# Patient Record
Sex: MALE | Race: WHITE | NOT HISPANIC OR LATINO | Employment: UNEMPLOYED | URBAN - NONMETROPOLITAN AREA
[De-identification: names, ages, dates, MRNs, and addresses within clinical notes are randomized per-mention and may not be internally consistent; named-entity substitution may affect disease eponyms.]

---

## 2024-08-13 ENCOUNTER — HOSPITAL ENCOUNTER (EMERGENCY)
Facility: HOSPITAL | Age: 54
Discharge: STILL ADMITTED OR ADMINISTRATIVE | End: 2024-08-13
Attending: STUDENT IN AN ORGANIZED HEALTH CARE EDUCATION/TRAINING PROGRAM
Payer: COMMERCIAL

## 2024-08-13 ENCOUNTER — HOSPITAL ENCOUNTER (OUTPATIENT)
Facility: HOSPITAL | Age: 54
Setting detail: OBSERVATION
Discharge: NURSING, REHAB OR LONG-TERM CARE | End: 2024-08-15
Attending: PSYCHIATRY & NEUROLOGY | Admitting: PSYCHIATRY & NEUROLOGY
Payer: COMMERCIAL

## 2024-08-13 VITALS
SYSTOLIC BLOOD PRESSURE: 155 MMHG | RESPIRATION RATE: 18 BRPM | OXYGEN SATURATION: 99 % | TEMPERATURE: 98.9 F | HEIGHT: 70 IN | WEIGHT: 180 LBS | DIASTOLIC BLOOD PRESSURE: 104 MMHG | HEART RATE: 104 BPM | BODY MASS INDEX: 25.77 KG/M2

## 2024-08-13 DIAGNOSIS — F10.10 ALCOHOL ABUSE: Primary | ICD-10-CM

## 2024-08-13 PROBLEM — F10.20 ETOH DEPENDENCE: Status: ACTIVE | Noted: 2024-08-13

## 2024-08-13 LAB
ALBUMIN SERPL-MCNC: 4.4 G/DL (ref 3.5–5.2)
ALBUMIN/GLOB SERPL: 1.6 G/DL
ALP SERPL-CCNC: 79 U/L (ref 39–117)
ALT SERPL W P-5'-P-CCNC: 25 U/L (ref 1–41)
AMPHET+METHAMPHET UR QL: NEGATIVE
AMPHETAMINES UR QL: NEGATIVE
AMYLASE SERPL-CCNC: 55 U/L (ref 28–100)
ANION GAP SERPL CALCULATED.3IONS-SCNC: 13.1 MMOL/L (ref 5–15)
AST SERPL-CCNC: 25 U/L (ref 1–40)
BARBITURATES UR QL SCN: NEGATIVE
BASOPHILS # BLD AUTO: 0.04 10*3/MM3 (ref 0–0.2)
BASOPHILS NFR BLD AUTO: 0.4 % (ref 0–1.5)
BENZODIAZ UR QL SCN: NEGATIVE
BILIRUB SERPL-MCNC: 0.5 MG/DL (ref 0–1.2)
BILIRUB UR QL STRIP: NEGATIVE
BUN SERPL-MCNC: 11 MG/DL (ref 6–20)
BUN/CREAT SERPL: 13.9 (ref 7–25)
BUPRENORPHINE SERPL-MCNC: NEGATIVE NG/ML
CALCIUM SPEC-SCNC: 9.1 MG/DL (ref 8.6–10.5)
CANNABINOIDS SERPL QL: NEGATIVE
CHLORIDE SERPL-SCNC: 99 MMOL/L (ref 98–107)
CLARITY UR: CLEAR
CO2 SERPL-SCNC: 24.9 MMOL/L (ref 22–29)
COCAINE UR QL: NEGATIVE
COLOR UR: YELLOW
CREAT SERPL-MCNC: 0.79 MG/DL (ref 0.76–1.27)
DEPRECATED RDW RBC AUTO: 43.5 FL (ref 37–54)
EGFRCR SERPLBLD CKD-EPI 2021: 104.9 ML/MIN/1.73
EOSINOPHIL # BLD AUTO: 0.11 10*3/MM3 (ref 0–0.4)
EOSINOPHIL NFR BLD AUTO: 1.1 % (ref 0.3–6.2)
ERYTHROCYTE [DISTWIDTH] IN BLOOD BY AUTOMATED COUNT: 13 % (ref 12.3–15.4)
ETHANOL BLD-MCNC: <10 MG/DL (ref 0–10)
ETHANOL UR QL: <0.01 %
FENTANYL UR-MCNC: NEGATIVE NG/ML
GLOBULIN UR ELPH-MCNC: 2.7 GM/DL
GLUCOSE SERPL-MCNC: 106 MG/DL (ref 65–99)
GLUCOSE UR STRIP-MCNC: NEGATIVE MG/DL
HCT VFR BLD AUTO: 44.2 % (ref 37.5–51)
HGB BLD-MCNC: 15 G/DL (ref 13–17.7)
HGB UR QL STRIP.AUTO: NEGATIVE
HOLD SPECIMEN: NORMAL
HOLD SPECIMEN: NORMAL
IMM GRANULOCYTES # BLD AUTO: 0.03 10*3/MM3 (ref 0–0.05)
IMM GRANULOCYTES NFR BLD AUTO: 0.3 % (ref 0–0.5)
KETONES UR QL STRIP: NEGATIVE
LEUKOCYTE ESTERASE UR QL STRIP.AUTO: NEGATIVE
LIPASE SERPL-CCNC: 13 U/L (ref 13–60)
LYMPHOCYTES # BLD AUTO: 1.49 10*3/MM3 (ref 0.7–3.1)
LYMPHOCYTES NFR BLD AUTO: 15.5 % (ref 19.6–45.3)
MAGNESIUM SERPL-MCNC: 2.1 MG/DL (ref 1.6–2.6)
MCH RBC QN AUTO: 30.7 PG (ref 26.6–33)
MCHC RBC AUTO-ENTMCNC: 33.9 G/DL (ref 31.5–35.7)
MCV RBC AUTO: 90.6 FL (ref 79–97)
METHADONE UR QL SCN: NEGATIVE
MONOCYTES # BLD AUTO: 0.43 10*3/MM3 (ref 0.1–0.9)
MONOCYTES NFR BLD AUTO: 4.5 % (ref 5–12)
NEUTROPHILS NFR BLD AUTO: 7.51 10*3/MM3 (ref 1.7–7)
NEUTROPHILS NFR BLD AUTO: 78.2 % (ref 42.7–76)
NITRITE UR QL STRIP: NEGATIVE
NRBC BLD AUTO-RTO: 0 /100 WBC (ref 0–0.2)
OPIATES UR QL: NEGATIVE
OXYCODONE UR QL SCN: NEGATIVE
PCP UR QL SCN: NEGATIVE
PH UR STRIP.AUTO: 7.5 [PH] (ref 5–8)
PLATELET # BLD AUTO: 225 10*3/MM3 (ref 140–450)
PMV BLD AUTO: 9.7 FL (ref 6–12)
POTASSIUM SERPL-SCNC: 4.2 MMOL/L (ref 3.5–5.2)
PROT SERPL-MCNC: 7.1 G/DL (ref 6–8.5)
PROT UR QL STRIP: NEGATIVE
QT INTERVAL: 358 MS
QTC INTERVAL: 454 MS
RBC # BLD AUTO: 4.88 10*6/MM3 (ref 4.14–5.8)
SODIUM SERPL-SCNC: 137 MMOL/L (ref 136–145)
SP GR UR STRIP: 1.01 (ref 1–1.03)
TRICYCLICS UR QL SCN: NEGATIVE
TROPONIN T SERPL HS-MCNC: 8 NG/L
UROBILINOGEN UR QL STRIP: NORMAL
WBC NRBC COR # BLD AUTO: 9.61 10*3/MM3 (ref 3.4–10.8)
WHOLE BLOOD HOLD COAG: NORMAL
WHOLE BLOOD HOLD SPECIMEN: NORMAL

## 2024-08-13 PROCEDURE — 83735 ASSAY OF MAGNESIUM: CPT | Performed by: PHYSICIAN ASSISTANT

## 2024-08-13 PROCEDURE — G0378 HOSPITAL OBSERVATION PER HR: HCPCS

## 2024-08-13 PROCEDURE — 63710000001 ONDANSETRON ODT 4 MG TABLET DISPERSIBLE: Performed by: PHYSICIAN ASSISTANT

## 2024-08-13 PROCEDURE — 82077 ASSAY SPEC XCP UR&BREATH IA: CPT | Performed by: PHYSICIAN ASSISTANT

## 2024-08-13 PROCEDURE — 99285 EMERGENCY DEPT VISIT HI MDM: CPT

## 2024-08-13 PROCEDURE — 63710000001 ONDANSETRON ODT 4 MG TABLET DISPERSIBLE: Performed by: PSYCHIATRY & NEUROLOGY

## 2024-08-13 PROCEDURE — 93005 ELECTROCARDIOGRAM TRACING: CPT | Performed by: PHYSICIAN ASSISTANT

## 2024-08-13 PROCEDURE — 84484 ASSAY OF TROPONIN QUANT: CPT | Performed by: PHYSICIAN ASSISTANT

## 2024-08-13 PROCEDURE — 82150 ASSAY OF AMYLASE: CPT | Performed by: PHYSICIAN ASSISTANT

## 2024-08-13 PROCEDURE — 80307 DRUG TEST PRSMV CHEM ANLYZR: CPT | Performed by: PHYSICIAN ASSISTANT

## 2024-08-13 PROCEDURE — 81003 URINALYSIS AUTO W/O SCOPE: CPT | Performed by: PHYSICIAN ASSISTANT

## 2024-08-13 PROCEDURE — 85025 COMPLETE CBC W/AUTO DIFF WBC: CPT | Performed by: PHYSICIAN ASSISTANT

## 2024-08-13 PROCEDURE — 93010 ELECTROCARDIOGRAM REPORT: CPT | Performed by: INTERNAL MEDICINE

## 2024-08-13 PROCEDURE — 83690 ASSAY OF LIPASE: CPT | Performed by: PHYSICIAN ASSISTANT

## 2024-08-13 PROCEDURE — 80053 COMPREHEN METABOLIC PANEL: CPT | Performed by: PHYSICIAN ASSISTANT

## 2024-08-13 PROCEDURE — 36415 COLL VENOUS BLD VENIPUNCTURE: CPT

## 2024-08-13 RX ORDER — TRAZODONE HYDROCHLORIDE 50 MG/1
50 TABLET ORAL NIGHTLY PRN
Status: DISCONTINUED | OUTPATIENT
Start: 2024-08-13 | End: 2024-08-15 | Stop reason: HOSPADM

## 2024-08-13 RX ORDER — LOPERAMIDE HYDROCHLORIDE 2 MG/1
2 CAPSULE ORAL
Status: DISCONTINUED | OUTPATIENT
Start: 2024-08-13 | End: 2024-08-15 | Stop reason: HOSPADM

## 2024-08-13 RX ORDER — ACETAMINOPHEN 325 MG/1
650 TABLET ORAL EVERY 6 HOURS PRN
Status: DISCONTINUED | OUTPATIENT
Start: 2024-08-13 | End: 2024-08-15 | Stop reason: HOSPADM

## 2024-08-13 RX ORDER — ALUMINA, MAGNESIA, AND SIMETHICONE 2400; 2400; 240 MG/30ML; MG/30ML; MG/30ML
15 SUSPENSION ORAL EVERY 6 HOURS PRN
Status: DISCONTINUED | OUTPATIENT
Start: 2024-08-13 | End: 2024-08-15 | Stop reason: HOSPADM

## 2024-08-13 RX ORDER — HYDROXYZINE 50 MG/1
50 TABLET, FILM COATED ORAL EVERY 6 HOURS PRN
Status: DISCONTINUED | OUTPATIENT
Start: 2024-08-13 | End: 2024-08-15 | Stop reason: HOSPADM

## 2024-08-13 RX ORDER — FAMOTIDINE 20 MG/1
20 TABLET, FILM COATED ORAL 2 TIMES DAILY PRN
Status: DISCONTINUED | OUTPATIENT
Start: 2024-08-13 | End: 2024-08-15 | Stop reason: HOSPADM

## 2024-08-13 RX ORDER — MULTIPLE VITAMINS W/ MINERALS TAB 9MG-400MCG
1 TAB ORAL DAILY
Status: DISCONTINUED | OUTPATIENT
Start: 2024-08-13 | End: 2024-08-15 | Stop reason: HOSPADM

## 2024-08-13 RX ORDER — ONDANSETRON 4 MG/1
4 TABLET, ORALLY DISINTEGRATING ORAL EVERY 6 HOURS PRN
Status: DISCONTINUED | OUTPATIENT
Start: 2024-08-13 | End: 2024-08-15 | Stop reason: HOSPADM

## 2024-08-13 RX ORDER — BENZONATATE 100 MG/1
100 CAPSULE ORAL 3 TIMES DAILY PRN
Status: DISCONTINUED | OUTPATIENT
Start: 2024-08-13 | End: 2024-08-15 | Stop reason: HOSPADM

## 2024-08-13 RX ORDER — BENZTROPINE MESYLATE 1 MG/1
2 TABLET ORAL ONCE AS NEEDED
Status: DISCONTINUED | OUTPATIENT
Start: 2024-08-13 | End: 2024-08-15 | Stop reason: HOSPADM

## 2024-08-13 RX ORDER — MULTIVITAMIN WITH IRON
2 TABLET ORAL DAILY
Status: DISCONTINUED | OUTPATIENT
Start: 2024-08-13 | End: 2024-08-15 | Stop reason: HOSPADM

## 2024-08-13 RX ORDER — ECHINACEA PURPUREA EXTRACT 125 MG
2 TABLET ORAL AS NEEDED
Status: DISCONTINUED | OUTPATIENT
Start: 2024-08-13 | End: 2024-08-15 | Stop reason: HOSPADM

## 2024-08-13 RX ORDER — POLYETHYLENE GLYCOL 3350 17 G/17G
17 POWDER, FOR SOLUTION ORAL DAILY PRN
Status: DISCONTINUED | OUTPATIENT
Start: 2024-08-13 | End: 2024-08-15 | Stop reason: HOSPADM

## 2024-08-13 RX ORDER — IBUPROFEN 400 MG/1
400 TABLET ORAL EVERY 6 HOURS PRN
Status: DISCONTINUED | OUTPATIENT
Start: 2024-08-13 | End: 2024-08-15 | Stop reason: HOSPADM

## 2024-08-13 RX ORDER — LORAZEPAM 2 MG/1
2 TABLET ORAL ONCE
Status: COMPLETED | OUTPATIENT
Start: 2024-08-13 | End: 2024-08-13

## 2024-08-13 RX ORDER — BENZTROPINE MESYLATE 1 MG/ML
1 INJECTION INTRAMUSCULAR; INTRAVENOUS ONCE AS NEEDED
Status: DISCONTINUED | OUTPATIENT
Start: 2024-08-13 | End: 2024-08-15 | Stop reason: HOSPADM

## 2024-08-13 RX ORDER — ONDANSETRON 4 MG/1
4 TABLET, ORALLY DISINTEGRATING ORAL ONCE
Status: COMPLETED | OUTPATIENT
Start: 2024-08-13 | End: 2024-08-13

## 2024-08-13 RX ADMIN — ONDANSETRON 4 MG: 4 TABLET, ORALLY DISINTEGRATING ORAL at 12:46

## 2024-08-13 RX ADMIN — TRAZODONE HYDROCHLORIDE 50 MG: 50 TABLET ORAL at 21:34

## 2024-08-13 RX ADMIN — ONDANSETRON 4 MG: 4 TABLET, ORALLY DISINTEGRATING ORAL at 21:34

## 2024-08-13 RX ADMIN — LORAZEPAM 2 MG: 2 TABLET ORAL at 12:46

## 2024-08-13 RX ADMIN — Medication 2 TABLET: at 17:24

## 2024-08-13 RX ADMIN — HYDROXYZINE HYDROCHLORIDE 50 MG: 50 TABLET, FILM COATED ORAL at 21:33

## 2024-08-13 RX ADMIN — Medication 1 TABLET: at 17:24

## 2024-08-13 RX ADMIN — Medication 100 MG: at 17:24

## 2024-08-13 NOTE — NURSING NOTE
Called via phone and spoke to Dr. Meadows. Intake assessment, labs and vital signs provided and discussed. Instructed to admit pt to detox unit OBS status with routine orders. RBVOx2.

## 2024-08-13 NOTE — NURSING NOTE
Intake assessment completed. Pt brought from [facility] which picked him up yesterday. Pt states last drink of beer was 8/12/24 @ 0945 prior to rehab picking him up. Pt denies any current drug use. Pt reports sobriety from [id number], but states been drinking 18-24 beers daily since then. Pt denies SI, HI, and AVH.   Appetite - fair  Sleep - poor  Anxiety 7/10  Depression 5/10  CIWA 8  Cravings 10/10

## 2024-08-13 NOTE — ED PROVIDER NOTES
Subjective   History of Present Illness  54-year-old male who presents to the ED today for detox evaluation.  He reports that he needs detox from alcohol.  He states his last use was yesterday.  He states he drinks greater than a case of beer per day.  He states he will drink until he passes out and when he wakes up he will start drinking again.  He denies any drug use.  He denies any suicidal ideations.  He states currently he is having sweats, shakes, chest pain, abdominal pain and a sore throat.    History provided by:  Patient  Drug / Alcohol Assessment  Primary symptoms comment: requesting detox. This is a new problem. The current episode started 12 to 24 hours ago. The problem has been gradually worsening. Suspected agents include alcohol. Pertinent negatives include no nausea and no vomiting. Associated medical issues include addiction treatment.       Review of Systems   Constitutional:  Positive for diaphoresis.   HENT:  Positive for sore throat.    Eyes: Negative.    Respiratory: Negative.     Cardiovascular:  Positive for chest pain.   Gastrointestinal:  Positive for abdominal pain. Negative for nausea and vomiting.   Genitourinary: Negative.    Musculoskeletal: Negative.    Skin: Negative.    Neurological:  Positive for tremors.   Psychiatric/Behavioral:  Negative for suicidal ideas.    All other systems reviewed and are negative.      Past Medical History:   Diagnosis Date    ADHD (attention deficit hyperactivity disorder)     Alcohol abuse     Arthritis     Bipolar disorder     Carpal tunnel syndrome     Degenerative disc disease, lumbar     Suicide attempt     Reports hx of overdosing, cutting wrist, and walking in traffic       Allergies   Allergen Reactions    Penicillins Hives       Past Surgical History:   Procedure Laterality Date    APPENDECTOMY         Family History   Problem Relation Age of Onset    Alcohol abuse Father        Social History     Socioeconomic History    Marital status: Single     Number of children: 0    Highest education level: 8th grade   Tobacco Use    Smoking status: Every Day     Current packs/day: 1.00     Average packs/day: 1 pack/day for 20.0 years (20.0 ttl pk-yrs)     Types: Cigarettes     Start date: 8/13/2004    Smokeless tobacco: Former   Vaping Use    Vaping status: Former   Substance and Sexual Activity    Alcohol use: Not Currently     Comment: See below    Drug use: Yes     Comment: ETOH    Sexual activity: Defer           Objective   Physical Exam  Vitals and nursing note reviewed.   Constitutional:       General: He is not in acute distress.     Appearance: Normal appearance.   HENT:      Head: Normocephalic and atraumatic.      Right Ear: External ear normal.      Left Ear: External ear normal.      Nose: Nose normal.   Eyes:      Conjunctiva/sclera: Conjunctivae normal.      Pupils: Pupils are equal, round, and reactive to light.   Cardiovascular:      Rate and Rhythm: Regular rhythm. Tachycardia present.      Pulses: Normal pulses.      Heart sounds: Normal heart sounds.   Pulmonary:      Effort: Pulmonary effort is normal.      Breath sounds: Normal breath sounds.   Abdominal:      General: Bowel sounds are normal.      Palpations: Abdomen is soft.      Tenderness: There is no abdominal tenderness.   Musculoskeletal:         General: Normal range of motion.      Cervical back: Normal range of motion and neck supple.   Skin:     General: Skin is warm and dry.      Capillary Refill: Capillary refill takes less than 2 seconds.   Neurological:      General: No focal deficit present.      Mental Status: He is alert and oriented to person, place, and time.      Comments: Patient is tremulous   Psychiatric:         Mood and Affect: Mood normal.         Procedures       Results for orders placed or performed during the hospital encounter of 08/13/24   Comprehensive Metabolic Panel    Specimen: Arm, Left; Blood   Result Value Ref Range    Glucose 106 (H) 65 - 99 mg/dL    BUN  11 6 - 20 mg/dL    Creatinine 0.79 0.76 - 1.27 mg/dL    Sodium 137 136 - 145 mmol/L    Potassium 4.2 3.5 - 5.2 mmol/L    Chloride 99 98 - 107 mmol/L    CO2 24.9 22.0 - 29.0 mmol/L    Calcium 9.1 8.6 - 10.5 mg/dL    Total Protein 7.1 6.0 - 8.5 g/dL    Albumin 4.4 3.5 - 5.2 g/dL    ALT (SGPT) 25 1 - 41 U/L    AST (SGOT) 25 1 - 40 U/L    Alkaline Phosphatase 79 39 - 117 U/L    Total Bilirubin 0.5 0.0 - 1.2 mg/dL    Globulin 2.7 gm/dL    A/G Ratio 1.6 g/dL    BUN/Creatinine Ratio 13.9 7.0 - 25.0    Anion Gap 13.1 5.0 - 15.0 mmol/L    eGFR 104.9 >60.0 mL/min/1.73   Urinalysis With Microscopic If Indicated (No Culture) - Urine, Clean Catch    Specimen: Urine, Clean Catch   Result Value Ref Range    Color, UA Yellow Yellow, Straw    Appearance, UA Clear Clear    pH, UA 7.5 5.0 - 8.0    Specific Gravity, UA 1.011 1.005 - 1.030    Glucose, UA Negative Negative    Ketones, UA Negative Negative    Bilirubin, UA Negative Negative    Blood, UA Negative Negative    Protein, UA Negative Negative    Leuk Esterase, UA Negative Negative    Nitrite, UA Negative Negative    Urobilinogen, UA 0.2 E.U./dL 0.2 - 1.0 E.U./dL   Ethanol    Specimen: Arm, Left; Blood   Result Value Ref Range    Ethanol <10 0 - 10 mg/dL    Ethanol % <0.010 %   Urine Drug Screen - Urine, Clean Catch    Specimen: Urine, Clean Catch   Result Value Ref Range    THC, Screen, Urine Negative Negative    Phencyclidine (PCP), Urine Negative Negative    Cocaine Screen, Urine Negative Negative    Methamphetamine, Ur Negative Negative    Opiate Screen Negative Negative    Amphetamine Screen, Urine Negative Negative    Benzodiazepine Screen, Urine Negative Negative    Tricyclic Antidepressants Screen Negative Negative    Methadone Screen, Urine Negative Negative    Barbiturates Screen, Urine Negative Negative    Oxycodone Screen, Urine Negative Negative    Buprenorphine, Screen, Urine Negative Negative   Magnesium    Specimen: Arm, Left; Blood   Result Value Ref Range     Magnesium 2.1 1.6 - 2.6 mg/dL   CBC Auto Differential    Specimen: Arm, Left; Blood   Result Value Ref Range    WBC 9.61 3.40 - 10.80 10*3/mm3    RBC 4.88 4.14 - 5.80 10*6/mm3    Hemoglobin 15.0 13.0 - 17.7 g/dL    Hematocrit 44.2 37.5 - 51.0 %    MCV 90.6 79.0 - 97.0 fL    MCH 30.7 26.6 - 33.0 pg    MCHC 33.9 31.5 - 35.7 g/dL    RDW 13.0 12.3 - 15.4 %    RDW-SD 43.5 37.0 - 54.0 fl    MPV 9.7 6.0 - 12.0 fL    Platelets 225 140 - 450 10*3/mm3    Neutrophil % 78.2 (H) 42.7 - 76.0 %    Lymphocyte % 15.5 (L) 19.6 - 45.3 %    Monocyte % 4.5 (L) 5.0 - 12.0 %    Eosinophil % 1.1 0.3 - 6.2 %    Basophil % 0.4 0.0 - 1.5 %    Immature Grans % 0.3 0.0 - 0.5 %    Neutrophils, Absolute 7.51 (H) 1.70 - 7.00 10*3/mm3    Lymphocytes, Absolute 1.49 0.70 - 3.10 10*3/mm3    Monocytes, Absolute 0.43 0.10 - 0.90 10*3/mm3    Eosinophils, Absolute 0.11 0.00 - 0.40 10*3/mm3    Basophils, Absolute 0.04 0.00 - 0.20 10*3/mm3    Immature Grans, Absolute 0.03 0.00 - 0.05 10*3/mm3    nRBC 0.0 0.0 - 0.2 /100 WBC   High Sensitivity Troponin T    Specimen: Arm, Left; Blood   Result Value Ref Range    HS Troponin T 8 <22 ng/L   Lipase    Specimen: Arm, Left; Blood   Result Value Ref Range    Lipase 13 13 - 60 U/L   Amylase    Specimen: Arm, Left; Blood   Result Value Ref Range    Amylase 55 28 - 100 U/L   Fentanyl, Urine - Urine, Clean Catch    Specimen: Urine, Clean Catch   Result Value Ref Range    Fentanyl, Urine Negative Negative   ECG 12 Lead Chest Pain   Result Value Ref Range    QT Interval 358 ms    QTC Interval 454 ms   Green Top (Gel)   Result Value Ref Range    Extra Tube Hold for add-ons.    Lavender Top   Result Value Ref Range    Extra Tube hold for add-on    Gold Top - SST   Result Value Ref Range    Extra Tube Hold for add-ons.    Light Blue Top   Result Value Ref Range    Extra Tube Hold for add-ons.           ED Course  ED Course as of 08/13/24 1737   Tue Aug 13, 2024   1411 EKG notes sinus rhythm.  97 bpm.  QTc 454.  No acute ST  elevation.  Electronically signed by Woodrow Woody DO, 08/13/24, 2:11 PM EDT.   [SF]      ED Course User Index  [SF] Woodrow Woody DO                                             Medical Decision Making  54-year-old male who presents to the ED today for detox evaluation.  He was medically cleared for the evaluation.  Psychiatry was consulted and he will be admitted.    Problems Addressed:  Alcohol abuse: complicated acute illness or injury    Amount and/or Complexity of Data Reviewed  Labs: ordered.  ECG/medicine tests: ordered.    Risk  Prescription drug management.        Final diagnoses:   Alcohol abuse       ED Disposition  ED Disposition       ED Disposition   DC/Transfer to Behavioral Health    Condition   Stable    Comment   --               No follow-up provider specified.       Medication List      No changes were made to your prescriptions during this visit.            Paige Harrison PA  08/13/24 2284

## 2024-08-14 ENCOUNTER — APPOINTMENT (OUTPATIENT)
Dept: GENERAL RADIOLOGY | Facility: HOSPITAL | Age: 54
End: 2024-08-14
Payer: COMMERCIAL

## 2024-08-14 PROBLEM — F31.9 BIPOLAR DISORDER: Status: ACTIVE | Noted: 2024-08-14

## 2024-08-14 PROBLEM — F17.200 NICOTINE USE DISORDER: Status: ACTIVE | Noted: 2024-08-14

## 2024-08-14 PROBLEM — F15.21 METHAMPHETAMINE USE DISORDER, MODERATE, IN EARLY REMISSION: Status: ACTIVE | Noted: 2024-08-14

## 2024-08-14 PROBLEM — F10.20 ALCOHOL USE DISORDER, SEVERE, DEPENDENCE: Status: ACTIVE | Noted: 2024-08-13

## 2024-08-14 LAB
GEN 5 2HR TROPONIN T REFLEX: 7 NG/L
QT INTERVAL: 366 MS
QTC INTERVAL: 422 MS
TROPONIN T DELTA: 1 NG/L
TROPONIN T SERPL HS-MCNC: 6 NG/L

## 2024-08-14 PROCEDURE — G0378 HOSPITAL OBSERVATION PER HR: HCPCS

## 2024-08-14 PROCEDURE — 93005 ELECTROCARDIOGRAM TRACING: CPT | Performed by: STUDENT IN AN ORGANIZED HEALTH CARE EDUCATION/TRAINING PROGRAM

## 2024-08-14 PROCEDURE — 71046 X-RAY EXAM CHEST 2 VIEWS: CPT | Performed by: RADIOLOGY

## 2024-08-14 PROCEDURE — 84484 ASSAY OF TROPONIN QUANT: CPT | Performed by: STUDENT IN AN ORGANIZED HEALTH CARE EDUCATION/TRAINING PROGRAM

## 2024-08-14 PROCEDURE — 63710000001 ONDANSETRON ODT 4 MG TABLET DISPERSIBLE: Performed by: PSYCHIATRY & NEUROLOGY

## 2024-08-14 PROCEDURE — 99222 1ST HOSP IP/OBS MODERATE 55: CPT | Performed by: PSYCHIATRY & NEUROLOGY

## 2024-08-14 PROCEDURE — 71046 X-RAY EXAM CHEST 2 VIEWS: CPT

## 2024-08-14 PROCEDURE — 93010 ELECTROCARDIOGRAM REPORT: CPT | Performed by: INTERNAL MEDICINE

## 2024-08-14 RX ADMIN — HYDROXYZINE HYDROCHLORIDE 50 MG: 50 TABLET, FILM COATED ORAL at 21:33

## 2024-08-14 RX ADMIN — Medication 2 TABLET: at 08:39

## 2024-08-14 RX ADMIN — HYDROXYZINE HYDROCHLORIDE 50 MG: 50 TABLET, FILM COATED ORAL at 15:17

## 2024-08-14 RX ADMIN — IBUPROFEN 400 MG: 400 TABLET, FILM COATED ORAL at 18:33

## 2024-08-14 RX ADMIN — Medication 1 TABLET: at 08:40

## 2024-08-14 RX ADMIN — Medication 100 MG: at 08:39

## 2024-08-14 RX ADMIN — TRAZODONE HYDROCHLORIDE 50 MG: 50 TABLET ORAL at 21:33

## 2024-08-14 RX ADMIN — ONDANSETRON 4 MG: 4 TABLET, ORALLY DISINTEGRATING ORAL at 15:17

## 2024-08-14 NOTE — NURSING NOTE
Pt lying in bed. Skin w/d/ normal. Pt states left sided chest pain started after the last free time outside. Reports that he had similar episode the day he came to ED. Reports pain is dull and intermittent. Pain was worse when laying on left side. Denies other symptoms. Pain 3/10. Pt medicated at this time with Ibuprofen. EKG completed, labs drawn, chest xray pending.

## 2024-08-14 NOTE — PLAN OF CARE
Goal Outcome Evaluation:  Plan of Care Reviewed With: patient  Patient Agreement with Plan of Care: agrees     Progress: no change  Outcome Evaluation: Pt rates Anx 5 Dep 5 Denies SI/HI/AVH Pt recieved PRN medication for sleep No complaints noted
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
## 2024-08-14 NOTE — H&P
INITIAL PSYCHIATRIC HISTORY & PHYSICAL    Patient Identification:  Name:  Ambrocio Cornelius  Age:  54 y.o.  Sex:  male  :  1970  MRN:  [id number]   Visit Number:  [id number]  Primary Care Physician:  Provider, No Known    SUBJECTIVE    CC/Focus of Exam: Alcohol use    HPI: Ambrocio Cornelius is a 54 y.o. male who was admitted under observation status on 2024 with complaints of alcohol use. The patient reports a long history of substance use. First use was at age 14 when he had his first taste of alcohol and drank beer. Over time the use increased and the patient  continued to use despite negative consequences including relationship problems, social, legal and financial problems. The patient endorses symptoms of tolerance and withdrawals and ongoing cravings to use. Has tried to cut down and stop but has not been successful. Spends too much time and resources in pursuit of substance use. Longest period of sobriety is reported to be 11 years.  Currently using over a case of beer daily.   Last use was two days ago.   Withdrawal symptoms sweats, shakes, stomach is sore a little bit.     PAST PSYCHIATRIC HX: Patient reports a history of bipolar disorder, has been admitted to inpatient psychiatric treatment in the past, has had a few suicidal attempts.     SUBSTANCE USE HX: See HPI. Patient also reports a history of heavy use of crack cocaine, started at age 15 and stopped at age 43. He also started using meth recently, a couple of grams daily via smoking. Started using in  and uses it a few days a week but not every day. He states his last meth use was about four months ago.     SOCIAL HX:   Social History     Socioeconomic History    Marital status: Single    Number of children: 0    Highest education level: 8th grade   Tobacco Use    Smoking status: Every Day     Current packs/day: 1.00     Average packs/day: 1 pack/day for 20.0 years (20.0 ttl pk-yrs)     Types: Cigarettes     Start date: 2004     Smokeless tobacco: Former   Vaping Use    Vaping status: Former   Substance and Sexual Activity    Alcohol use: Not Currently     Comment: See below    Drug use: Yes     Comment: ETOH    Sexual activity: Defer         Past Medical History:   Diagnosis Date    ADHD (attention deficit hyperactivity disorder)     Alcohol abuse     Arthritis     Bipolar disorder     Carpal tunnel syndrome     Degenerative disc disease, lumbar     Suicide attempt     Reports hx of overdosing, cutting wrist, and walking in traffic          Past Surgical History:   Procedure Laterality Date    APPENDECTOMY         Family History   Problem Relation Age of Onset    Alcohol abuse Father          No medications prior to admission.         ALLERGIES:  Penicillins    Temp:  [97.1 °F (36.2 °C)-98.9 °F (37.2 °C)] 97.6 °F (36.4 °C)  Heart Rate:  [] 79  Resp:  [16-18] 18  BP: (116-155)/() 142/75    REVIEW OF SYSTEMS:  Review of Systems   Constitutional:  Positive for fatigue.   HENT: Negative.     Eyes: Negative.    Respiratory: Negative.     Cardiovascular: Negative.    Gastrointestinal:  Positive for nausea.   Endocrine: Negative.    Genitourinary: Negative.    Musculoskeletal: Negative.    Skin: Negative.    Neurological:  Positive for tremors and weakness.   Hematological: Negative.    Psychiatric/Behavioral:  Positive for dysphoric mood. The patient is nervous/anxious.         OBJECTIVE    PHYSICAL EXAM:  Physical Exam  Constitutional:  Appears well-developed and well-nourished.   HENT:   Head: Normocephalic and atraumatic.   Right Ear: External ear normal.   Left Ear: External ear normal.   Mouth/Throat: Oropharynx is clear and moist.   Eyes: Pupils are equal, round, and reactive to light. Conjunctivae and EOM are normal.   Neck: Normal range of motion. Neck supple.   Cardiovascular: Normal rate, regular rhythm and normal heart sounds.    Respiratory: Effort normal and breath sounds normal. No respiratory distress. No wheezes.    GI: Soft. Bowel sounds are normal.No distension. There is no tenderness.   Musculoskeletal: Normal range of motion. No edema or deformity.   Neurological:  Cranial Nerves: I. No anosmia. II: No visual disturbance. III, IV VI: EOMI, PERRLA. V: Corneal reflext intact, no abnormal sensations. VII: No facial palsy, or altered sensation. VIII: Hearing intact, balance intact. IX: Intact ah reflex. X: Normal phonation, swallowing. XI: Normal shrug and head movement. XII: Intact tongue movements  Coordination normal. No lateralizing signs.  Skin: Skin is warm and dry. No rash noted. No erythema.     MENTAL STATUS EXAM:   Hygiene:   fair  Cooperation:  Cooperative  Eye Contact:  Fair  Psychomotor Behavior:  Appropriate  Affect:  Appropriate  Hopelessness: Denies  Speech:  Normal  Thought Process: Goal directed  Thought Content:  Normal  Suicidal:  None  Homicidal:  None  Hallucinations:  None  Delusion:  None  Memory:  Intact  Orientation:  Person, Place, Time and Situation  Reliability:  fair  Insight:  Fair  Judgement:  Fair  Impulse Control:  Fair    Imaging Results (Last 24 Hours)       ** No results found for the last 24 hours. **             ECG/EMG Results (most recent)       None             Lab Results   Component Value Date    GLUCOSE 106 (H) 08/13/2024    BUN 11 08/13/2024    CREATININE 0.79 08/13/2024    BCR 13.9 08/13/2024    CO2 24.9 08/13/2024    CALCIUM 9.1 08/13/2024    ALBUMIN 4.4 08/13/2024    AST 25 08/13/2024    ALT 25 08/13/2024       Lab Results   Component Value Date    WBC 9.61 08/13/2024    HGB 15.0 08/13/2024    HCT 44.2 08/13/2024    MCV 90.6 08/13/2024     08/13/2024       Last Urine Toxicity          Latest Ref Rng & Units 8/13/2024   LAST URINE TOXICITY RESULTS   Amphetamine, Urine Qual Negative Negative    Barbiturates Screen, Urine Negative Negative    Benzodiazepine Screen, Urine Negative Negative    Buprenorphine, Screen, Urine Negative Negative    Cocaine Screen, Urine Negative  Negative    Fentanyl, Urine Negative Negative    Methadone Screen , Urine Negative Negative    Methamphetamine, Ur Negative Negative       Details                   Brief Urine Lab Results  (Last result in the past 365 days)        Color   Clarity   Blood   Leuk Est   Nitrite   Protein   CREAT   Urine HCG        08/13/24 1244 Yellow   Clear   Negative   Negative   Negative   Negative                     ASSESSMENT & PLAN:    Hospital bed: No      Alcohol use disorder, severe, dependence  -Monitor for withdrawals and treat as indicated      Methamphetamine use disorder, moderate, in early remission  -Encourage ongoing abstinence      Nicotine use disorder  -Encouraged cessation      Bipolar disorder unspecified  -Patient reports he has a history of mood swings lasting minutes to hours, he reports his mood to be stable. Will continue to monitor.     The patient has been admitted under observation status for safety and stabilization.  Patient will be monitored for withdrawals and maintained on Special Precautions Level 4 (q30 min checks).  The patient will have individual and group therapy with a master's level therapist. A master treatment plan will be developed and agreed upon by the patient and his/her treatment team.  The patient's estimated length of stay in the hospital is 3-5 days.

## 2024-08-14 NOTE — NURSING NOTE
Client complaining of chest pain at center of chest radiating to left shoulder with complaint of pressure also noted.  Spoke with dr. Vang and notified her of client complaint.  Stat EKG, stat troponin and stat chest xray pa and lateral ordered. Primary nurse notified.  See vitals

## 2024-08-14 NOTE — PLAN OF CARE
Goal Outcome Evaluation:  Plan of Care Reviewed With: patient  Patient Agreement with Plan of Care: agrees     Progress: improving  Outcome Evaluation: Pt reports depression 6/10, anxiety 2/10, craving 0/10. Denies SI/HI/AVH. Denies issues with appetite or sleep.

## 2024-08-15 VITALS
DIASTOLIC BLOOD PRESSURE: 75 MMHG | TEMPERATURE: 98.3 F | HEIGHT: 70 IN | BODY MASS INDEX: 26.69 KG/M2 | HEART RATE: 85 BPM | RESPIRATION RATE: 16 BRPM | OXYGEN SATURATION: 97 % | WEIGHT: 186.4 LBS | SYSTOLIC BLOOD PRESSURE: 135 MMHG

## 2024-08-15 PROCEDURE — 99238 HOSP IP/OBS DSCHRG MGMT 30/<: CPT | Performed by: PSYCHIATRY & NEUROLOGY

## 2024-08-15 PROCEDURE — G0378 HOSPITAL OBSERVATION PER HR: HCPCS

## 2024-08-15 RX ADMIN — Medication 2 TABLET: at 08:08

## 2024-08-15 RX ADMIN — Medication 100 MG: at 08:08

## 2024-08-15 RX ADMIN — Medication 1 TABLET: at 08:08

## 2024-08-15 NOTE — PLAN OF CARE
Goal Outcome Evaluation:  Plan of Care Reviewed With: patient  Patient Agreement with Plan of Care: agrees     Progress: improving  Outcome Evaluation: Pt reports sleep and appetite are good. Rates depression 3/10, anxiety 1/10, craving 0/10.

## 2024-08-15 NOTE — PLAN OF CARE
Goal Outcome Evaluation:  Plan of Care Reviewed With: patient  Patient Agreement with Plan of Care: agrees     Progress: improving  Outcome Evaluation: Pt deemed stable for discharge to rehab.

## 2024-08-15 NOTE — PROGRESS NOTES
Navigator is helping with the following referral:    [facility] - [phone]  -Spoke with Vijaya. They will accept patient at discharge.  8/15

## 2024-08-15 NOTE — DISCHARGE SUMMARY
":  1970  MRN:  [id number]  Visit Number:  [id number]      Date of Admission:2024   Date of Discharge:  8/15/2024    Discharge Diagnosis:  Principal Problem:    Alcohol use disorder, severe, dependence  Active Problems:    Methamphetamine use disorder, moderate, in early remission    Nicotine use disorder        Admission Diagnosis:  Alcohol abuse [F10.10]  EtOH dependence [F10.20]     APRIL Cornelius is a 54 y.o. male who was admitted under observation status on 2024 with complaints of alcohol use.   For details please see H&P dated 24.     Hospital Course  Patient is a 54 y.o. male presented with alcohol use and was admitted under observation status. He was monitored for active withdrawals but he didn't experience or exhibit any. His mood stayed stable and sleep and appetite were also good. The patient felt ready to go to rehab and continue treatment there.       Mental Status Exam upon discharge:   Mood \"good\"   Affect-congruent, appropriate, stable  Thought Content-goal directed, no delusional material present  Thought process-linear, organized.  Suicidality: No SI  Homicidality: No HI  Perception: No /    Procedures Performed         Consults:   Consults       No orders found from 7/15/2024 to 2024.            Pertinent Test Results:   Admission on 2024   Component Date Value Ref Range Status    QT Interval 2024 366  ms Final    QTC Interval 2024 422  ms Final    HS Troponin T 2024 6  <22 ng/L Final    HS Troponin T 2024 7  <22 ng/L Final    Troponin T Delta 2024 1  >=-4 - <+4 ng/L Final   Admission on 2024, Discharged on 2024   Component Date Value Ref Range Status    Glucose 2024 106 (H)  65 - 99 mg/dL Final    BUN 2024 11  6 - 20 mg/dL Final    Creatinine 2024 0.79  0.76 - 1.27 mg/dL Final    Sodium 2024 137  136 - 145 mmol/L Final    Potassium 2024 4.2  3.5 - 5.2 mmol/L Final    Chloride " 08/13/2024 99  98 - 107 mmol/L Final    CO2 08/13/2024 24.9  22.0 - 29.0 mmol/L Final    Calcium 08/13/2024 9.1  8.6 - 10.5 mg/dL Final    Total Protein 08/13/2024 7.1  6.0 - 8.5 g/dL Final    Albumin 08/13/2024 4.4  3.5 - 5.2 g/dL Final    ALT (SGPT) 08/13/2024 25  1 - 41 U/L Final    AST (SGOT) 08/13/2024 25  1 - 40 U/L Final    Alkaline Phosphatase 08/13/2024 79  39 - 117 U/L Final    Total Bilirubin 08/13/2024 0.5  0.0 - 1.2 mg/dL Final    Globulin 08/13/2024 2.7  gm/dL Final    A/G Ratio 08/13/2024 1.6  g/dL Final    BUN/Creatinine Ratio 08/13/2024 13.9  7.0 - 25.0 Final    Anion Gap 08/13/2024 13.1  5.0 - 15.0 mmol/L Final    eGFR 08/13/2024 104.9  >60.0 mL/min/1.73 Final    Color, UA 08/13/2024 Yellow  Yellow, Straw Final    Appearance, UA 08/13/2024 Clear  Clear Final    pH, UA 08/13/2024 7.5  5.0 - 8.0 Final    Specific Gravity, UA 08/13/2024 1.011  1.005 - 1.030 Final    Glucose, UA 08/13/2024 Negative  Negative Final    Ketones, UA 08/13/2024 Negative  Negative Final    Bilirubin, UA 08/13/2024 Negative  Negative Final    Blood, UA 08/13/2024 Negative  Negative Final    Protein, UA 08/13/2024 Negative  Negative Final    Leuk Esterase, UA 08/13/2024 Negative  Negative Final    Nitrite, UA 08/13/2024 Negative  Negative Final    Urobilinogen, UA 08/13/2024 0.2 E.U./dL  0.2 - 1.0 E.U./dL Final    Ethanol 08/13/2024 <10  0 - 10 mg/dL Final    Ethanol % 08/13/2024 <0.010  % Final    THC, Screen, Urine 08/13/2024 Negative  Negative Final    Phencyclidine (PCP), Urine 08/13/2024 Negative  Negative Final    Cocaine Screen, Urine 08/13/2024 Negative  Negative Final    Methamphetamine, Ur 08/13/2024 Negative  Negative Final    Opiate Screen 08/13/2024 Negative  Negative Final    Amphetamine Screen, Urine 08/13/2024 Negative  Negative Final    Benzodiazepine Screen, Urine 08/13/2024 Negative  Negative Final    Tricyclic Antidepressants Screen 08/13/2024 Negative  Negative Final    Methadone Screen, Urine 08/13/2024  Negative  Negative Final    Barbiturates Screen, Urine 08/13/2024 Negative  Negative Final    Oxycodone Screen, Urine 08/13/2024 Negative  Negative Final    Buprenorphine, Screen, Urine 08/13/2024 Negative  Negative Final    Magnesium 08/13/2024 2.1  1.6 - 2.6 mg/dL Final    WBC 08/13/2024 9.61  3.40 - 10.80 10*3/mm3 Final    RBC 08/13/2024 4.88  4.14 - 5.80 10*6/mm3 Final    Hemoglobin 08/13/2024 15.0  13.0 - 17.7 g/dL Final    Hematocrit 08/13/2024 44.2  37.5 - 51.0 % Final    MCV 08/13/2024 90.6  79.0 - 97.0 fL Final    MCH 08/13/2024 30.7  26.6 - 33.0 pg Final    MCHC 08/13/2024 33.9  31.5 - 35.7 g/dL Final    RDW 08/13/2024 13.0  12.3 - 15.4 % Final    RDW-SD 08/13/2024 43.5  37.0 - 54.0 fl Final    MPV 08/13/2024 9.7  6.0 - 12.0 fL Final    Platelets 08/13/2024 225  140 - 450 10*3/mm3 Final    Neutrophil % 08/13/2024 78.2 (H)  42.7 - 76.0 % Final    Lymphocyte % 08/13/2024 15.5 (L)  19.6 - 45.3 % Final    Monocyte % 08/13/2024 4.5 (L)  5.0 - 12.0 % Final    Eosinophil % 08/13/2024 1.1  0.3 - 6.2 % Final    Basophil % 08/13/2024 0.4  0.0 - 1.5 % Final    Immature Grans % 08/13/2024 0.3  0.0 - 0.5 % Final    Neutrophils, Absolute 08/13/2024 7.51 (H)  1.70 - 7.00 10*3/mm3 Final    Lymphocytes, Absolute 08/13/2024 1.49  0.70 - 3.10 10*3/mm3 Final    Monocytes, Absolute 08/13/2024 0.43  0.10 - 0.90 10*3/mm3 Final    Eosinophils, Absolute 08/13/2024 0.11  0.00 - 0.40 10*3/mm3 Final    Basophils, Absolute 08/13/2024 0.04  0.00 - 0.20 10*3/mm3 Final    Immature Grans, Absolute 08/13/2024 0.03  0.00 - 0.05 10*3/mm3 Final    nRBC 08/13/2024 0.0  0.0 - 0.2 /100 WBC Final    Extra Tube 08/13/2024 Hold for add-ons.   Final    Auto resulted.    Extra Tube 08/13/2024 hold for add-on   Final    Auto resulted    Extra Tube 08/13/2024 Hold for add-ons.   Final    Auto resulted.    Extra Tube 08/13/2024 Hold for add-ons.   Final    Auto resulted    QT Interval 08/13/2024 358  ms Final    QTC Interval 08/13/2024 454  ms Final     HS Troponin T 08/13/2024 8  <22 ng/L Final    Lipase 08/13/2024 13  13 - 60 U/L Final    Amylase 08/13/2024 55  28 - 100 U/L Final    Fentanyl, Urine 08/13/2024 Negative  Negative Final        Condition on Discharge:  improved    Vital Signs  Temp:  [97.4 °F (36.3 °C)-98.6 °F (37 °C)] 97.8 °F (36.6 °C)  Heart Rate:  [62-97] 75  Resp:  [16-18] 16  BP: (121-141)/(68-79) 122/77      Discharge Disposition:  Rehab Facility or Unit (DC - External)    Discharge Medications:     Discharge Medications      Patient Not Prescribed Medications Upon Discharge         Discharge Diet: Regular     Activity at Discharge: As tolerated     Follow-up Appointments  [facility]      Time: I spent  < 30  minutes on this discharge activity which included: face-to-face encounter with the patient, reviewing the data in the system, coordination of the care with the nursing staff as well as consultants, documentation, and entering orders.        Clinician:   Rakan Jackson MD  08/15/24  11:45 EDT

## 2024-08-15 NOTE — PLAN OF CARE
Problem: Adult Behavioral Health Plan of Care  Goal: Develops/Participates in Therapeutic [place] to Support Successful Transition  Intervention: Mutually Develop Transition Plan  Recent Flowsheet Documentation  Taken 8/15/2024 1425 by Kaylen Aburto LCSW  Transportation Anticipated: agency  Transportation Concerns: none  Current Discharge Risk:   psychiatric illness   substance use/abuse  Concerns to be Addressed:   coping/stress   substance/tobacco abuse/use   mental health   relationship  Readmission Within the Last 30 Days: no previous admission in last 30 days  Patient/Family Anticipated Services at Transition: rehabilitation services  Patient/Family Anticipates Transition to: inpatient rehabilitation facility  Offered/Gave Vendor List: no    Patient is denying suicidal ideation today and denying homicidal ideation today.  Patient reports decrease in depression and decrease in anxiety today.  Patient reports decrease in cravings and withdrawal symptoms.  Patient is future oriented, is successful in identifying protective factors and reports being ready for discharge.  Patient will be transported today by [facility] and will engage in residential [facility] treatment.

## 2024-08-15 NOTE — PLAN OF CARE
Goal Outcome Evaluation:  Plan of Care Reviewed With: patient  Patient Agreement with Plan of Care: agrees     Progress: improving     Pt given Atarax and Trazodone prn medication. Pt rates anxiety 1/10, denies depression and denies cravings.